# Patient Record
Sex: FEMALE | Race: WHITE | NOT HISPANIC OR LATINO | URBAN - METROPOLITAN AREA
[De-identification: names, ages, dates, MRNs, and addresses within clinical notes are randomized per-mention and may not be internally consistent; named-entity substitution may affect disease eponyms.]

---

## 2023-05-25 ENCOUNTER — APPOINTMENT (OUTPATIENT)
Dept: URBAN - METROPOLITAN AREA CLINIC 235 | Age: 28
Setting detail: DERMATOLOGY
End: 2023-05-25

## 2023-05-25 DIAGNOSIS — L30.9 DERMATITIS, UNSPECIFIED: ICD-10-CM

## 2023-05-25 PROCEDURE — OTHER PRESCRIPTION MEDICATION MANAGEMENT: OTHER

## 2023-05-25 PROCEDURE — OTHER COUNSELING: OTHER

## 2023-05-25 PROCEDURE — 99202 OFFICE O/P NEW SF 15 MIN: CPT

## 2023-05-25 PROCEDURE — OTHER MIPS QUALITY: OTHER

## 2023-05-25 PROCEDURE — OTHER MEDICATION COUNSELING: OTHER

## 2023-05-25 PROCEDURE — OTHER PRESCRIPTION: OTHER

## 2023-05-25 RX ORDER — HYDROCORTISONE 25 MG/G
OINTMENT TOPICAL
Qty: 28.35 | Refills: 0 | Status: ERX | COMMUNITY
Start: 2023-05-25

## 2023-05-25 RX ORDER — MUPIROCIN 20 MG/G
OINTMENT TOPICAL
Qty: 22 | Refills: 1 | Status: ERX | COMMUNITY
Start: 2023-05-25

## 2023-05-25 ASSESSMENT — LOCATION ZONE DERM: LOCATION ZONE: NOSE

## 2023-05-25 ASSESSMENT — LOCATION SIMPLE DESCRIPTION DERM: LOCATION SIMPLE: LEFT NOSE

## 2023-05-25 ASSESSMENT — LOCATION DETAILED DESCRIPTION DERM: LOCATION DETAILED: LEFT NARIS

## 2023-05-25 NOTE — PROCEDURE: MEDICATION COUNSELING
Patient/Caregiver provided printed discharge information. Quinolones Counseling:  I discussed with the patient the risks of fluoroquinolones including but not limited to GI upset, allergic reaction, drug rash, diarrhea, dizziness, photosensitivity, yeast infections, liver function test abnormalities, tendonitis/tendon rupture.

## 2023-05-25 NOTE — HPI: RASH (PATIENT REPORTED)
Where Is Your Rash Located?: Started on left cheek and is now inside nose
Additional Comments (Use Complete Sentences): Pt has a history of dry skin and eczema. Pt is putting eczema cream on the area and states there has been no improvement and does burn when applied.

## 2023-05-25 NOTE — PROCEDURE: PRESCRIPTION MEDICATION MANAGEMENT
Initiate Treatment: -hydrocortisone 2.5 % topical ointment \\nSig: Apply to affected areas on nose twice daily for two weeks, then stop\\n\\n-mupirocin 2 % topical ointment : Apply to affected areas on nose twice daily for two weeks, then stop
Detail Level: Zone
Render In Strict Bullet Format?: No

## 2023-05-25 NOTE — PROCEDURE: MEDICATION COUNSELING
19-Sep-2018 02:37 Gabapentin Counseling: I discussed with the patient the risks of gabapentin including but not limited to dizziness, somnolence, fatigue and ataxia.
